# Patient Record
Sex: FEMALE | Race: WHITE | HISPANIC OR LATINO | URBAN - METROPOLITAN AREA
[De-identification: names, ages, dates, MRNs, and addresses within clinical notes are randomized per-mention and may not be internally consistent; named-entity substitution may affect disease eponyms.]

---

## 2017-03-02 ENCOUNTER — APPOINTMENT (OUTPATIENT)
Age: 47
Setting detail: DERMATOLOGY
End: 2017-03-02

## 2017-03-02 DIAGNOSIS — Z41.9 ENCOUNTER FOR PROCEDURE FOR PURPOSES OTHER THAN REMEDYING HEALTH STATE, UNSPECIFIED: ICD-10-CM

## 2017-03-02 PROBLEM — L70.0 ACNE VULGARIS: Status: ACTIVE | Noted: 2017-03-02

## 2017-03-02 PROCEDURE — OTHER BOTOX: OTHER

## 2017-03-02 NOTE — PROCEDURE: BOTOX
Inferior Lateral Orbicularis Oculi Units: 0
Dilution (U/0.1 Cc): 4
Price (Use Numbers Only, No Special Characters Or $): 565
Detail Level: Zone
Post-Care Instructions: Patient instructed to not lie down for 4 hours and limit physical activity for 24 hours. Patient instructed not to travel by airplane for 48 hours.
Consent: Written consent obtained. Risks include but not limited to lid/brow ptosis, bruising, swelling, diplopia, temporary effect, incomplete chemical denervation.

## 2017-07-12 ENCOUNTER — APPOINTMENT (OUTPATIENT)
Age: 47
Setting detail: DERMATOLOGY
End: 2017-07-12

## 2017-07-12 DIAGNOSIS — Z41.9 ENCOUNTER FOR PROCEDURE FOR PURPOSES OTHER THAN REMEDYING HEALTH STATE, UNSPECIFIED: ICD-10-CM

## 2017-07-12 PROCEDURE — OTHER FILLERS: OTHER

## 2017-07-12 PROCEDURE — OTHER BOTOX: OTHER

## 2017-07-12 NOTE — PROCEDURE: BOTOX
Glabellar Complex Units: 0
Dilution (U/0.1 Cc): 2.5
Post-Care Instructions: Patient instructed to not lie down for 4 hours and limit physical activity for 24 hours. Patient instructed not to travel by airplane for 48 hours.
Consent: Written consent obtained. Risks include but not limited to lid/brow ptosis, bruising, swelling, diplopia, temporary effect, incomplete chemical denervation.
Price (Use Numbers Only, No Special Characters Or $): 309
Detail Level: Zone

## 2017-07-12 NOTE — PROCEDURE: FILLERS
Additional Area 4 Volume In Cc: 0
Topical Anesthesia?: BLT cream (benzocaine 20%, lidocaine 6%, tetracaine 4%)
Additional Area 2 Location: Volllure 2 syringes training
Additional Area 1 Location: Vollure training
Price (Use Numbers Only, No Special Characters Or $): 943
Post-Care Instructions: Patient instructed to apply ice to reduce swelling.
Use Map Statement For Sites (Optional): No
Filler: Juvederm Vollure XC
Detail Level: Detailed
Consent: Written consent obtained. Risks include but not limited to bruising, beading, irregular texture, ulceration, infection, allergic reaction, scar formation, incomplete augmentation, temporary nature, procedural pain.
Anesthesia Type: 0.3% lidocaine (mixed within filler)
Map Statment: See Attach Map for Complete Details

## 2017-12-06 ENCOUNTER — APPOINTMENT (OUTPATIENT)
Age: 47
Setting detail: DERMATOLOGY
End: 2017-12-06

## 2017-12-06 DIAGNOSIS — Z41.9 ENCOUNTER FOR PROCEDURE FOR PURPOSES OTHER THAN REMEDYING HEALTH STATE, UNSPECIFIED: ICD-10-CM

## 2017-12-06 PROCEDURE — OTHER BOTOX: OTHER

## 2017-12-06 NOTE — PROCEDURE: BOTOX
Additional Area 4 Units: 0
Dilution (U/0.1 Cc): 2.5
Consent: Written consent obtained. Risks include but not limited to lid/brow ptosis, bruising, swelling, diplopia, temporary effect, incomplete chemical denervation.
Price (Use Numbers Only, No Special Characters Or $): 369
Detail Level: Zone
Post-Care Instructions: Patient instructed to not lie down for 4 hours and limit physical activity for 24 hours. Patient instructed not to travel by airplane for 48 hours.

## 2018-05-31 ENCOUNTER — APPOINTMENT (OUTPATIENT)
Age: 48
Setting detail: DERMATOLOGY
End: 2018-05-31

## 2018-05-31 DIAGNOSIS — Z41.9 ENCOUNTER FOR PROCEDURE FOR PURPOSES OTHER THAN REMEDYING HEALTH STATE, UNSPECIFIED: ICD-10-CM

## 2018-05-31 PROCEDURE — OTHER BOTOX: OTHER

## 2018-05-31 NOTE — PROCEDURE: BOTOX
Additional Area 5 Units: 0
Consent: Written consent obtained. Risks include but not limited to lid/brow ptosis, bruising, swelling, diplopia, temporary effect, incomplete chemical denervation.
Post-Care Instructions: Patient instructed to not lie down for 4 hours and limit physical activity for 24 hours. Patient instructed not to travel by airplane for 48 hours.
Price (Use Numbers Only, No Special Characters Or $): 222.39
Dilution (U/0.1 Cc): 2.5
Additional Area 1 Location: armpits bilateral
Additional Area 2 Location: palms bilateral
Detail Level: Zone

## 2018-10-11 ENCOUNTER — APPOINTMENT (OUTPATIENT)
Age: 48
Setting detail: DERMATOLOGY
End: 2018-10-11

## 2018-10-11 DIAGNOSIS — Z41.9 ENCOUNTER FOR PROCEDURE FOR PURPOSES OTHER THAN REMEDYING HEALTH STATE, UNSPECIFIED: ICD-10-CM

## 2018-10-11 PROBLEM — L70.0 ACNE VULGARIS: Status: ACTIVE | Noted: 2018-10-11

## 2018-10-11 PROCEDURE — OTHER BOTOX: OTHER

## 2018-10-11 PROCEDURE — OTHER FILLERS: OTHER

## 2018-10-11 NOTE — PROCEDURE: FILLERS
Brows Filler Volume In Cc: 0
Include Cannula Information In Note?: No
Detail Level: Detailed
Additional Area 1 Location: Versa training
Additional Area 2 Location: versa 2 syringes training
Map Statment: See Attach Map for Complete Details
Consent: Written consent obtained. Risks include but not limited to bruising, beading, irregular texture, ulceration, infection, allergic reaction, scar formation, incomplete augmentation, temporary nature, procedural pain.
Topical Anesthesia?: BLT cream (benzocaine 20%, lidocaine 10%, tetracaine 10%)
Price (Use Numbers Only, No Special Characters Or $): 734
Anesthesia Type: 0.3% lidocaine (mixed within filler)
Post-Care Instructions: Patient instructed to apply ice to reduce swelling.
Filler: Juvederm Vollure XC

## 2018-10-11 NOTE — PROCEDURE: BOTOX
Depressor Anguli Oris Units: 0
Detail Level: Zone
Post-Care Instructions: Patient instructed to not lie down for 4 hours and limit physical activity for 24 hours. Patient instructed not to travel by airplane for 48 hours.
Consent: Written consent obtained. Risks include but not limited to lid/brow ptosis, bruising, swelling, diplopia, temporary effect, incomplete chemical denervation.
Dilution (U/0.1 Cc): 2.5
Price (Use Numbers Only, No Special Characters Or $): 838

## 2019-03-20 ENCOUNTER — APPOINTMENT (OUTPATIENT)
Age: 49
Setting detail: DERMATOLOGY
End: 2019-03-20

## 2019-03-20 DIAGNOSIS — Z41.9 ENCOUNTER FOR PROCEDURE FOR PURPOSES OTHER THAN REMEDYING HEALTH STATE, UNSPECIFIED: ICD-10-CM

## 2019-03-20 PROCEDURE — OTHER FILLERS: OTHER

## 2019-03-20 PROCEDURE — OTHER BOTOX: OTHER

## 2019-03-20 NOTE — PROCEDURE: FILLERS
Tear Troughs Filler Volume In Cc: 0
Include Cannula Information In Note?: No
Post-Care Instructions: Patient instructed to apply ice to reduce swelling.
Price (Use Numbers Only, No Special Characters Or $): 610
Detail Level: Detailed
Map Statment: See Attach Map for Complete Details
Consent: Written consent obtained. Risks include but not limited to bruising, beading, irregular texture, ulceration, infection, allergic reaction, scar formation, incomplete augmentation, temporary nature, procedural pain.
Anesthesia Type: 0.3% lidocaine (mixed within filler)
Filler: Juvederm Ultra Plus XC
Topical Anesthesia?: BLT cream (benzocaine 20%, lidocaine 10%, tetracaine 10%)

## 2019-03-20 NOTE — PROCEDURE: BOTOX
Dilution (U/0.1 Cc): 2.5
Additional Area 4 Units: 0
Detail Level: Zone
Price (Use Numbers Only, No Special Characters Or $): 774
Consent: Written consent obtained. Risks include but not limited to lid/brow ptosis, bruising, swelling, diplopia, temporary effect, incomplete chemical denervation.
Post-Care Instructions: Patient instructed to not lie down for 4 hours and limit physical activity for 24 hours. Patient instructed not to travel by airplane for 48 hours.

## 2019-10-03 ENCOUNTER — APPOINTMENT (OUTPATIENT)
Age: 49
Setting detail: DERMATOLOGY
End: 2019-10-03

## 2019-10-03 DIAGNOSIS — Z41.9 ENCOUNTER FOR PROCEDURE FOR PURPOSES OTHER THAN REMEDYING HEALTH STATE, UNSPECIFIED: ICD-10-CM

## 2019-10-03 PROCEDURE — OTHER FILLERS: OTHER

## 2019-10-03 PROCEDURE — OTHER BOTOX: OTHER

## 2019-10-03 NOTE — PROCEDURE: BOTOX
Additional Area 6 Units: 0
Show Masseter Units: Yes
Show Mentalis Units: No
Additional Area 1 Location: [facility]
Consent: Written consent obtained. Risks include but not limited to lid/brow ptosis, bruising, swelling, diplopia, temporary effect, incomplete chemical denervation.
Dilution (U/0.1 Cc): 2.5
Detail Level: Zone
Price (Use Numbers Only, No Special Characters Or $): 699
Post-Care Instructions: Patient instructed to not lie down for 4 hours and limit physical activity for 24 hours. Patient instructed not to travel by airplane for 48 hours.

## 2019-10-03 NOTE — PROCEDURE: FILLERS
Additional Area 4 Volume In Cc: 0
Include Cannula Information In Note?: No
Detail Level: Detailed
Topical Anesthesia?: BLT cream (benzocaine 20%, lidocaine 10%, tetracaine 10%)
Filler: Juvederm Volbella XC
Post-Care Instructions: Patient instructed to apply ice to reduce swelling.
Consent: Written consent obtained. Risks include but not limited to bruising, beading, irregular texture, ulceration, infection, allergic reaction, scar formation, incomplete augmentation, temporary nature, procedural pain.
Anesthesia Type: 0.3% lidocaine (mixed within filler)
Filler: Juvederm Voluma XC
Map Statment: See Attach Map for Complete Details
Price (Use Numbers Only, No Special Characters Or $): 5352

## 2020-03-19 ENCOUNTER — APPOINTMENT (OUTPATIENT)
Age: 50
Setting detail: DERMATOLOGY
End: 2020-03-19

## 2020-03-19 DIAGNOSIS — Z41.9 ENCOUNTER FOR PROCEDURE FOR PURPOSES OTHER THAN REMEDYING HEALTH STATE, UNSPECIFIED: ICD-10-CM

## 2020-03-19 PROCEDURE — OTHER FILLERS: OTHER

## 2020-03-19 PROCEDURE — OTHER BOTOX: OTHER

## 2020-03-19 NOTE — PROCEDURE: FILLERS
Cheeks Filler Volume In Cc: 0
Include Cannula Information In Note?: No
Price (Use Numbers Only, No Special Characters Or $): 145
Map Statment: See Attach Map for Complete Details
Detail Level: Detailed
Anesthesia Type: 0.3% lidocaine (mixed within filler)
Filler: Juvederm Vollure XC
Topical Anesthesia?: BLT cream (benzocaine 20%, lidocaine 6%, tetracaine 4%)
Post-Care Instructions: Patient instructed to apply ice to reduce swelling.
Consent: Written consent obtained. Risks include but not limited to bruising, beading, irregular texture, ulceration, infection, allergic reaction, scar formation, incomplete augmentation, temporary nature, procedural pain.

## 2020-03-19 NOTE — PROCEDURE: BOTOX
Post-Care Instructions: Patient instructed to not lie down for 4 hours and limit physical activity for 24 hours. Patient instructed not to travel by airplane for 48 hours.
Additional Area 3 Units: 0
Consent: Written consent obtained. Risks include but not limited to lid/brow ptosis, bruising, swelling, diplopia, temporary effect, incomplete chemical denervation.
Show Anterior Platysmal Band Units: Yes
Show Mentalis Units: No
Dilution (U/0.1 Cc): 2.5
Detail Level: Zone
Price (Use Numbers Only, No Special Characters Or $): 552

## 2020-11-04 ENCOUNTER — APPOINTMENT (OUTPATIENT)
Age: 50
Setting detail: DERMATOLOGY
End: 2020-11-04

## 2020-11-04 DIAGNOSIS — Z41.9 ENCOUNTER FOR PROCEDURE FOR PURPOSES OTHER THAN REMEDYING HEALTH STATE, UNSPECIFIED: ICD-10-CM

## 2020-11-04 PROCEDURE — OTHER FILLERS: OTHER

## 2020-11-04 PROCEDURE — OTHER BOTOX: OTHER

## 2020-11-04 NOTE — PROCEDURE: FILLERS
Include Cannula Information In Note?: No
Jawline Filler Volume In Cc: 0
Topical Anesthesia?: BLT cream (benzocaine 20%, lidocaine 6%, tetracaine 4%)
Filler: Juvederm Volbella XC
Map Statment: See Attach Map for Complete Details
Detail Level: Detailed
Price (Use Numbers Only, No Special Characters Or $): 1947
Anesthesia Type: 0.3% lidocaine (mixed within filler)
Consent: Written consent obtained. Risks include but not limited to bruising, beading, irregular texture, ulceration, infection, allergic reaction, scar formation, incomplete augmentation, temporary nature, procedural pain.
Post-Care Instructions: Patient instructed to apply ice to reduce swelling.
Filler: Juvederm Ultra Plus XC

## 2021-02-26 ENCOUNTER — APPOINTMENT (OUTPATIENT)
Age: 51
Setting detail: DERMATOLOGY
End: 2021-02-26

## 2021-02-26 DIAGNOSIS — Z41.9 ENCOUNTER FOR PROCEDURE FOR PURPOSES OTHER THAN REMEDYING HEALTH STATE, UNSPECIFIED: ICD-10-CM

## 2021-02-26 PROCEDURE — OTHER BOTOX: OTHER

## 2021-02-26 PROCEDURE — OTHER JUVEDERM VOLLURE XC INJECTION: OTHER

## 2021-02-26 PROCEDURE — OTHER INVENTORY: OTHER

## 2021-02-26 PROCEDURE — OTHER JUVEDERM ULTRA PLUS XC INJECTION: OTHER

## 2021-02-26 NOTE — PROCEDURE: JUVEDERM VOLLURE XC INJECTION
Vermilion Lips Filler Volume In Cc: 0
Detail Level: Detailed
Number Of Syringes (Required For Inventory): 2
Additional Anesthesia Volume In Cc: 6
Post-Care Instructions: Patient instructed to apply ice to reduce swelling.
Anesthesia Volume In Cc: 0.5
Consent: Written consent obtained. Risks include but not limited to bruising, beading, irregular texture, ulceration, infection, allergic reaction, scar formation, incomplete augmentation, temporary nature, procedural pain.
Including Pricing Information In The Note: No
Show Inventory Tab: Show
Filler: Juvederm Vollure XC
Procedural Text: The filler was administered to the treatment areas noted above.
Anesthesia Type: 1% lidocaine with epinephrine
Map Statment: See Attach Map for Complete Details

## 2021-02-26 NOTE — PROCEDURE: BOTOX
Periorbital Skin Units: 0
Show Mentalis Units: No
Show Orbicularis Oculi Units: Yes
Detail Level: Zone
Show Inventory Tab: Hide
Consent: Written consent obtained. Risks include but not limited to lid/brow ptosis, bruising, swelling, diplopia, temporary effect, incomplete chemical denervation.
Dilution (U/0.1 Cc): 2
Post-Care Instructions: Patient instructed to not lie down for 4 hours and limit physical activity for 24 hours. Patient instructed not to travel by airplane for 48 hours.

## 2021-02-26 NOTE — PROCEDURE: JUVEDERM ULTRA PLUS XC INJECTION
Additional Area 2 Volume In Cc: 0
Detail Level: Detailed
Show Inventory Tab: Show
Map Statment: See Attach Map for Complete Details
Use Map Statement For Sites (Optional): No
Procedural Text: The filler was administered to the treatment areas noted above.
Post-Care Instructions: Patient instructed to apply ice to reduce swelling.
Anesthesia Type: 1% lidocaine with epinephrine
Additional Anesthesia Volume In Cc: 6
Consent: Written consent obtained. Risks include but not limited to bruising, beading, irregular texture, ulceration, infection, allergic reaction, scar formation, incomplete augmentation, temporary nature, procedural pain.
Filler: Juvederm Ultra Plus XC
Anesthesia Volume In Cc: 0.5
Number Of Syringes (Required For Inventory): 1

## 2021-06-30 ENCOUNTER — APPOINTMENT (OUTPATIENT)
Age: 51
Setting detail: DERMATOLOGY
End: 2021-06-30

## 2021-06-30 DIAGNOSIS — Z41.9 ENCOUNTER FOR PROCEDURE FOR PURPOSES OTHER THAN REMEDYING HEALTH STATE, UNSPECIFIED: ICD-10-CM

## 2021-06-30 PROCEDURE — OTHER JUVEDERM VOLLURE XC INJECTION: OTHER

## 2021-06-30 PROCEDURE — OTHER BOTOX: OTHER

## 2021-06-30 NOTE — PROCEDURE: BOTOX
Additional Area 4 Units: 0
Show Additional Area 3: Yes
Additional Area 1 Units: 100
Show Right And Left Periorbital Units: No
Detail Level: Zone
Additional Area 2 Location: multiple
Consent: Written consent obtained. Risks include but not limited to lid/brow ptosis, bruising, swelling, diplopia, temporary effect, incomplete chemical denervation.
Additional Area 2 Units: 2
Post-Care Instructions: Patient instructed to not lie down for 4 hours and limit physical activity for 24 hours. Patient instructed not to travel by airplane for 48 hours.
Show Inventory Tab: Show

## 2021-06-30 NOTE — PROCEDURE: JUVEDERM VOLLURE XC INJECTION
Mid Face Filler Volume In Cc: 0
Filler: Juvederm Vollure XC
Procedural Text: The filler was administered to the treatment areas noted above.
Number Of Syringes (Required For Inventory): 3
Include Cannula Information In Note?: No
Anesthesia Type: 1% lidocaine with epinephrine
Post-Care Instructions: Patient instructed to apply ice to reduce swelling.
Detail Level: Detailed
Consent: Written consent obtained. Risks include but not limited to bruising, beading, irregular texture, ulceration, infection, allergic reaction, scar formation, incomplete augmentation, temporary nature, procedural pain.
Additional Anesthesia Volume In Cc: 6
Anesthesia Volume In Cc: 0.5
Show Inventory Tab: Show
Map Statment: See Attach Map for Complete Details

## 2021-12-23 ENCOUNTER — APPOINTMENT (OUTPATIENT)
Age: 51
Setting detail: DERMATOLOGY
End: 2021-12-23

## 2021-12-23 DIAGNOSIS — Z41.9 ENCOUNTER FOR PROCEDURE FOR PURPOSES OTHER THAN REMEDYING HEALTH STATE, UNSPECIFIED: ICD-10-CM

## 2021-12-23 PROBLEM — L70.0 ACNE VULGARIS: Status: ACTIVE | Noted: 2021-12-23

## 2021-12-23 PROCEDURE — OTHER BOTOX: OTHER

## 2021-12-23 PROCEDURE — OTHER JEUVEAU: OTHER

## 2021-12-23 NOTE — PROCEDURE: JEUVEAU
Show Right And Left Brow Units: No
Additional Area 4 Units: 0
Show Orbicularis Oculi Units: Yes
Price (Use Numbers Only, No Special Characters Or $): 470
Additional Area 1 Units: 52
Detail Level: Detailed
Dilution (U/0.1 Cc): 2.5
Post-Care Instructions: Patient instructed to not lie down for 4 hours and limit physical activity for 24 hours.
Consent: Written consent obtained. Risks include but not limited to lid/brow ptosis, bruising, swelling, diplopia, temporary effect, incomplete chemical denervation.
Show Inventory Tab: Show

## 2021-12-23 NOTE — PROCEDURE: BOTOX
Glabellar Complex Units: 0
Show Inventory Tab: Show
Show Additional Area 1: Yes
Show Ucl Units: No
Consent: Written consent obtained. Risks include but not limited to lid/brow ptosis, bruising, swelling, diplopia, temporary effect, incomplete chemical denervation.
Additional Area 1 Location: multiple
Detail Level: Zone
Post-Care Instructions: Patient instructed to not lie down for 4 hours and limit physical activity for 24 hours. Patient instructed not to travel by airplane for 48 hours.
Additional Area 1 Units: 50
Dilution (U/0.1 Cc): 2

## 2022-05-06 ENCOUNTER — APPOINTMENT (OUTPATIENT)
Age: 52
Setting detail: DERMATOLOGY
End: 2022-05-06

## 2022-05-06 DIAGNOSIS — Z41.9 ENCOUNTER FOR PROCEDURE FOR PURPOSES OTHER THAN REMEDYING HEALTH STATE, UNSPECIFIED: ICD-10-CM

## 2022-05-06 PROBLEM — L70.0 ACNE VULGARIS: Status: ACTIVE | Noted: 2022-05-06

## 2022-05-06 PROCEDURE — OTHER BOTOX: OTHER

## 2022-05-06 PROCEDURE — OTHER JUVEDERM VOLBELLA INJECTION: OTHER

## 2022-05-06 NOTE — PROCEDURE: BOTOX
Show Additional Area 6: Yes
Post-Care Instructions: Patient instructed to not lie down for 4 hours and limit physical activity for 24 hours. Patient instructed not to travel by airplane for 48 hours.
Additional Area 6 Units: 0
Additional Area 1 Units: 2
Detail Level: Zone
Additional Area 2 Units: 100
Show Mentalis Units: No
Show Inventory Tab: Show
Additional Area 2 Location: multiple
Consent: Written consent obtained. Risks include but not limited to lid/brow ptosis, bruising, swelling, diplopia, temporary effect, incomplete chemical denervation.

## 2022-05-06 NOTE — PROCEDURE: JUVEDERM VOLBELLA INJECTION
Filler: Juvederm Volbella XC
Anesthesia Volume In Cc: 0.5
Lateral Face Filler Volume In Cc: 0
Procedural Text: The filler was administered to the treatment areas noted above.
Include Cannula Information In Note?: No
Anesthesia Type: 1% lidocaine with epinephrine
Post-Care Instructions: Patient instructed to apply ice to reduce swelling.
Map Statment: See Attach Map for Complete Details
Consent: Written consent obtained. Risks include but not limited to bruising, beading, irregular texture, ulceration, infection, allergic reaction, scar formation, incomplete augmentation, temporary nature, procedural pain.
Detail Level: Detailed
Show Inventory Tab: Show
Additional Anesthesia Volume In Cc: 6
Number Of Syringes (Required For Inventory): 1

## 2022-05-20 ENCOUNTER — APPOINTMENT (OUTPATIENT)
Age: 52
Setting detail: DERMATOLOGY
End: 2022-05-20

## 2022-05-20 DIAGNOSIS — Z41.9 ENCOUNTER FOR PROCEDURE FOR PURPOSES OTHER THAN REMEDYING HEALTH STATE, UNSPECIFIED: ICD-10-CM

## 2022-05-20 PROBLEM — L70.0 ACNE VULGARIS: Status: ACTIVE | Noted: 2022-05-20

## 2022-05-20 PROCEDURE — OTHER SCULPTRA: OTHER

## 2022-05-20 NOTE — PROCEDURE: SCULPTRA
Anesthesia Volume In Cc: 0.5
Injection Technique: The Sculptra was injected to the listed areas after cleansing the skin and providing appropriate anesthesia.
Left Buccal Filler Volume In Cc: 0
Show Right And Left Middle Malar Volume?: No
Show Forehead Volume?: Yes
Vials Reconstituted (Required For Inventory): 2
Anesthesia Type: 1% lidocaine with epinephrine
Map Statement: See Attached Map for Complete Details.
Additional Anesthesia Volume In Cc: 6
Consent: Written consent obtained. Risks include but not limited to bruising, beading, irregular texture, ulceration, infection, allergic reaction, scar formation, incomplete augmentation, temporary nature, procedural pain.
Post-Care Instructions: Patient instructed to apply ice to reduce swelling.
Detail Level: Detailed
Dilution Method: The Sculptra was diluted with 5 ml of sterile water for a total volume of 9ccs for each vial.
Show Inventory Tab: Show

## 2022-07-01 ENCOUNTER — APPOINTMENT (OUTPATIENT)
Age: 52
Setting detail: DERMATOLOGY
End: 2022-07-01

## 2022-07-01 DIAGNOSIS — Z41.9 ENCOUNTER FOR PROCEDURE FOR PURPOSES OTHER THAN REMEDYING HEALTH STATE, UNSPECIFIED: ICD-10-CM

## 2022-07-01 PROCEDURE — OTHER SCULPTRA: OTHER

## 2022-07-01 NOTE — PROCEDURE: SCULPTRA
Show Second Additional Location?: Yes
Right Zygomatic Arche Filler Volume In Cc: 0
Show Right And Left Temple Volume?: No
Consent: Written consent obtained. Risks include but not limited to bruising, beading, irregular texture, ulceration, infection, allergic reaction, scar formation, incomplete augmentation, temporary nature, procedural pain.
Anesthesia Type: 1% lidocaine with epinephrine
Detail Level: Detailed
Dilution Method: The Sculptra was diluted with 5 ml of sterile water for a total volume of 9ccs for each vial.
Injection Technique: The Sculptra was injected to the listed areas after cleansing the skin and providing appropriate anesthesia.
Map Statement: See Attached Map for Complete Details.
Additional Anesthesia Volume In Cc: 6
Vials Reconstituted (Required For Inventory): 2
Anesthesia Volume In Cc: 0.5
Show Inventory Tab: Show
Post-Care Instructions: Patient instructed to apply ice to reduce swelling.

## 2022-08-03 ENCOUNTER — APPOINTMENT (OUTPATIENT)
Age: 52
Setting detail: DERMATOLOGY
End: 2022-08-03

## 2022-08-03 DIAGNOSIS — Z41.9 ENCOUNTER FOR PROCEDURE FOR PURPOSES OTHER THAN REMEDYING HEALTH STATE, UNSPECIFIED: ICD-10-CM

## 2022-08-03 PROBLEM — L70.0 ACNE VULGARIS: Status: ACTIVE | Noted: 2022-08-03

## 2022-08-03 PROCEDURE — OTHER SCULPTRA: OTHER

## 2022-08-03 NOTE — PROCEDURE: SCULPTRA
Lateral Face Sculptra Filler Volume In Cc: 0
Show Right And Left Nasolabial Fold Volume?: No
Render Post-Care In The Note: Yes
Post-Care Instructions: Patient instructed to apply ice to reduce swelling.
Anesthesia Volume In Cc: 0.5
Injection Technique: The Sculptra was injected to the listed areas after cleansing the skin and providing appropriate anesthesia.
Dilution Method: The Sculptra was diluted with 5 ml of sterile water for a total volume of 9ccs for each vial.
Anesthesia Type: 1% lidocaine with epinephrine
Detail Level: Detailed
Map Statement: See Attached Map for Complete Details.
Show Inventory Tab: Show
Vials Reconstituted (Required For Inventory): 2
Additional Anesthesia Volume In Cc: 6
Consent: Written consent obtained. Risks include but not limited to bruising, beading, irregular texture, ulceration, infection, allergic reaction, scar formation, incomplete augmentation, temporary nature, procedural pain.

## 2024-05-18 NOTE — PROCEDURE: BOTOX
Mentalis Units: 0
Price (Use Numbers Only, No Special Characters Or $): 5377
Show Right And Left Periorbital Units: No
Show Depressor Anguli Units: Yes
Post-Care Instructions: Patient instructed to not lie down for 4 hours and limit physical activity for 24 hours. Patient instructed not to travel by airplane for 48 hours.
Consent: Written consent obtained. Risks include but not limited to lid/brow ptosis, bruising, swelling, diplopia, temporary effect, incomplete chemical denervation.
Detail Level: Zone
Dilution (U/0.1 Cc): 2.5
132